# Patient Record
Sex: MALE | Race: WHITE
[De-identification: names, ages, dates, MRNs, and addresses within clinical notes are randomized per-mention and may not be internally consistent; named-entity substitution may affect disease eponyms.]

---

## 2017-06-20 ENCOUNTER — HOSPITAL ENCOUNTER (OUTPATIENT)
Dept: HOSPITAL 45 - C.PATHSPEC | Age: 62
Discharge: HOME | End: 2017-06-20
Attending: DERMATOLOGY
Payer: COMMERCIAL

## 2017-06-20 DIAGNOSIS — L82.1: Primary | ICD-10-CM

## 2018-08-09 ENCOUNTER — HOSPITAL ENCOUNTER (OUTPATIENT)
Dept: HOSPITAL 45 - C.NEUR | Age: 63
Discharge: HOME | End: 2018-08-09
Payer: COMMERCIAL

## 2018-08-09 DIAGNOSIS — G47.33: Primary | ICD-10-CM

## 2018-08-10 NOTE — PAP/PSG TECHNICIAN REPORT
[facility]

Technician Polysomnogram Report



Study name:

None

Report date:

8/10/2018



Study date:

8/9/2018

Referring Physician:

 Ya Spencer



Name:

PATEL OSBORN

Interpreting Physician:

Matt Sanchez M.D.



YOB: 1955

Technician:

Tracie Farias RPS.



Sex:

Male







Age:

62

Study Type:

PSG PAP



Weight:

173 lbs









Height:

62 years, Height 5' 10"







BMI:

24.82







Medications:

BUPROPION 100 MG, MIRTAZAPINE 30 MG, SELENIUM SULFIDE 2.5% LOTION, ATENOLOL 25 MG, LISINOPRIL 10 
MG, MOMETASONE FUROATE 50 MCG/ACT, CLONAZEPAM 0.5 MG, CHLOR-TRIMENTON 4 MG, MULTI VIT, PROBIOTIC, 
LORATIDINE 10 MG















Patient History





62 yr-old male here for a CPAP update study.  He will be switched to BIPAP if CPAP becomes too 
uncomfortable.  He has been having trouble tolerating CPAP treatment.  He complains of mask leak and 
high pressures.  He is back to assess his pressure settings and REZA.  He will try a different full 
face mask to find one that is comfortable.  His [place] scale is 4.

The test was started on room air and 4 CMH2O.

ETCO2 testing was not utilized during this study.

Room 1







Parameters Monitored

NPSG: E1-M2, E2-M1, Fp1-M2, Fp2-M1, F3-M2, F4-M2, F4-M1, C3-M2, C4-M2, C4-M1, O1-M2, O2-M2,

O2-M1, T3-M2, T4-M1, P3-M2, P4-M1, CHIN1, CHIN2, HR, EKG, Legs, PFLOW, SNOR, FLOW, CFLOW,

Tidal Volume, THOR, ABDO, SpO2, PLTH, CPRESS, ETCO2 Wave, ETCO2, pH





Sleep Architecture



Sleep Stages



Time at Lights Off

11:19:45 PM



STAGES

Time (min.)

TST (%)



Time at Lights On

5:51:15 AM



Wake

54.5

--



Total Recording Time (TRT)

391.50 min.



N1

72.0

21



Total Sleep Period (TSP)

382.5 min.



N2

238.0

71



Total Sleep Time (TST)

337.0min.



N3

0.0

0



Awake Time

54.5 min.



REM

27.0

8



Wake after Sleep Onset

45.5 min.











Sleep Efficiency (SE)

86 %











Sleep Onset Latency (SOL)

9.0 min.











Number of Stage 1 Shifts

None











Awakenings

21











Stage Changes

110











Number of REM periods

4



REM

27.0

8



REM Latency

136.5 min.



NREM

310.0

92





Body Position Analysis





Supine

Right

Left

Side

Prone

Vertical



Total Sleep Time (min.)

391.5

0.0

0.0

0.00

0.0

0.0



Total Sleep Time (%)

100%

0%

0%

0

0%

N/A%



Total Sleep Time REM (min.)

27.0

0.0

0.0

None

0.0

0.0



Total Sleep Time NREM (min.)

310.0

0.0

0.0

None

0.0

0.0



Intermittent Wake (min.)

54.5

0.0

0.0

None

0.0

0.0



Total Sleep Period (%)

100%

None





Arousals







Myoclonus (PLM) *







Events

Count

Index



Events

Count

Index



Spontaneous

54

10



Events Awake (PLMW)

48

52.8



Respiratory

21

4.5



Events Asleep w/ Arousal (PLMA)

0

0.0



PLM

0

0



Events Asleep w/o Arousal (PLMS)

17

3.0



Snoring

2

0



Total Asleep

17

3.0



Total

77

14



Total

65

10







Respiratory Analysis *





CA

OA

MA

CH

H

RERA

Total



Count

3

11

1

0

15

3

30



Index

0.5

2.0

0.2

0

2.7

1

5.9



Mean Duration

17.5

22.6

26.2

0.00

26.6

21.9

24.0



Longest Duration

21.7

31.7

26.2

0.00

26.2

24.9

37.3







Respiratory Event Summary 







Total

Supine

~Supine

Right

Left

Prone

REM

NREM



Apneas

Count

15

15

N/A

0

15





Index

2.7

3

N/A

0

3



Hypopneas (4% Desat)

Count

15

15

N/A

0

15





Index

2.7

2.7

N/A

0.0

2.9



Apneas & All Hypopneas 

Count

30

30

N/A

0

30





Index

5.3

5

N/A

0.0

5.8



Respiratory Events 

(Apn+All Hyp+RERA)

Count

30

33

N/A

0

30





Index

5.9

6

N/A

0.0

6.4



Respiratory Related Arousal

Count

21

33

N/A

0

25





Index

4.5

4

N/A

0

5





Snoring Analysis





Supine

Right

Left

Prone

REM

NREM

Total



Snore duration

1.7 min



Snores count

51

N/A

0

51

51



Snore mean duration

2.1 Sec



Snores index

9

N/A

0.0

9.9

9.1



TST with snoring (%)

0.5%







Desaturation Event Summary:



Minimum %SpO2

Event Count

Mean/Min/Max Duration(sec.)

Desaturation Index

% Time In Bed



> 90

31

35.0 / 6.5 / 60.0

4.9

98.9



86 - 90

0

N/A

0.0

1.1



81 - 85

0

N/A

0.0

0.0



76 - 80

0

N/A

0.0

0.0



71 - 75

0

N/A

0.0

0.0



66 - 70

0

N/A

0.0

0.0



61 - 65

0

N/A

0.0

0.0



56 - 60

0

N/A

0.0

0.0



51 - 55

0

N/A

0.0

0.0



< 50

0

N/A

0.0

0.0









Total

REM

NREM

Awake



<50%

0.0 min.



51 - 60%

0.0 min.



61 - 70%

0.0 min.



71 - 80%

0.0 min.



81 - 90%

4.3 min.

0.0 min.

3.1 min.

1.3 min.



91 - 100%

378.2 min.

27.0 min.

307.0 min.

44.3 min.



Average

95

96

95

95



Minimum SpO2

84

91

87

84



Desaturation Event Index

4.8

0.0

5.2

5.5



# Desat. Events below 89%

6

N/A

5

1



Time(%) with Saturation below 89%

0.4

0.0

0.2

0.2



Time(min.) with Saturation below 89%

1.6

0.0

0.9

0.7









Heart Rate Analysis









Min (bpm)

Max (bpm)

Average (bpm)



Awake

70

94

79



NREM

68

92

78



REM

70

87

80



Overall

68

92

78













Supplemental O2 Values



Minimum O2 level: None



Value  

Start Time

End Time





Technician Comments





Mr. Osborn slept only in the supine position.  No cardiac arrhythmias or PLMs noted.  No bruxism 
noted.  CPAP was initiated at +4 CMH2O and up-titrated to a level of +8 CMH2O, Cflex 2.  Late into 
the study he was switched over to BiPAP at +10/6 CMH2O to see if it was more comfortable than CPAP.  
He seemed to have more respiratory events while on BiPAP than on CPAP.  The pressure was then 
up-titrated to a level of +14/10 BiFlex 2.  A Quattro Air full face mask size medium from [employer] was 
used during titration.  He stated that this mask sealed and stayed put better than his usual mask 
which is a Quattro FX full face mask.  He awoke to use the restroom one time during the night.  Mr. Osborn stated that he slept about the same as usual. 

The final report will be interpreted and signed by a sleep physician. The completed physician report 
will then be placed in the patient medical record.







 



 



 



 



 



 



 



 

CPAP REPORT



Therapy Detail

Time / Page #

Comment



CPAP 4 cm H2O  

Full Face Mask  

Flex Pressure Relief  

Humidifier on  



11:18:37 PM / pg. 252





CPAP 6 cm H2O  

Full Face Mask  

Flex Pressure Relief  

Humidifier on  



11:55:52 PM / pg. 327

INCREASED FOR HYPOPNEAS



CPAP 7 cm H2O  

Full Face Mask  

Flex Pressure Relief  

Humidifier on  



2:05:22 AM / pg. 586

INCREASED FOR HYPOPNEAS



CPAP 8 cm H2O  

Full Face Mask  

Flex Pressure Relief  

Humidifier on  



4:27:06 AM / pg. 869

INCREASED FOR RERAS AND HYPOPNEAS



BiLevel 10/6 cm H2O  

Full Face Mask  

Flex Pressure Relief  

Humidifier on  



4:44:06 AM / pg. 903

SWITCHED TO BIPAP TO SEE IF IT WAS MORE COMFORTABLE THAN CPAP OF 8



BiLevel 12/8 cm H2O  

Full Face Mask  

Flex Pressure Relief  

Humidifier on  



5:01:37 AM / pg. 938

INCREASED EPAP FOR OBSTRUCTIVE APNEAS AND INCREASED THE IPAP TO KEEP THE PRESSURE DIFFERENTIAL OF 4 
CM



BiLevel 14/10 cm H2O  

Full Face Mask  

Flex Pressure Relief  

Humidifier on  



5:35:51 AM / pg. 1007

INCREASED EPAP FOR MORE OBSTRUCTIVE APNEAS AND INCREASED IPAP TO KEEP THE PRESSURE DIFFERENTIAL OF 
4 CM



Therapy Event:



Therapy (cm H20)

4

6

7

8

10/6

12/8

14/10



Total Time at Pressure (min.)

36.1

129.5

141.7

17.0

17.5

34.2

15.4



TST at Pressure (min.)

26.1

124.5

115.7

11.1

12.4

31.7

15.4



# Periods

1



Sleep Onset (min.)

9.0

0.0

3.1

0.0

0.0



REM Onset (min.)

N/A

109.4

62.9

N/A

N/A

6.1

N/A



Sleep Efficiency %

72

96

81

65

70

92

100



Wakefulness (%)

27.7

3.9

18.3

34.4

29.4

7.3

0.0



Wakefulness (min.)

10.0

5.0

26.0

5.9

5.1

2.5

0.0



NREM 1 (%)

31.8

7.3

15.5

17.6

67.8

37.2

9.0



NREM 1 (min.)

11.5

9.5

22.0

3.0

11.9

12.7

1.4



NREM 2 (%)

40.5

84.9

55.2

47.9

2.9

36.5

91.0



NREM 2 (min.)

14.6

110.0

78.2

8.1

0.5

12.5

14.0



NREM 3 (%)

0.0



NREM 3 (min.)

0.0



REM (%)

0.0

3.9

10.9

0.0

0.0

19.0

0.0



REM (min.)

0.0

5.0

15.5

0.0

0.0

6.5

0.0



# Arousals

7

17

23

6

8

14

2



Arousal Index

16.1

8.2

11.9

32.3

38.8

26.5

7.8



# Snore

8

25

8

3

4

2

1



Snore Index

18.4

12.0

4.1

16.1

19.4

3.8

3.9



AHI

9.2

1.9

1.6

26.9

34.0

9.4

7.8



AHI Supine

9.2

1.9

1.6

26.9

34.0

9.4

7.8



AHI Non-Supine

N/A



NREM AHI

9.2

2.0

1.8

26.9

34.0

11.9

7.8



REM AHI

N/A

0.0

0.0

N/A

N/A

0.0

N/A



RDI

9.2

2.9

2.1

26.9

34.0

9.4

7.8



# Obstructive

1

2

0

0

3

3

2



# Central Ap

0

2

1

0



# Mixed

0

1

0



# Hypopneas

3

2

3

5

2

0

0



RERAS

0

2

1

0



Total Respiratory Events

4

6

4

5

7

5

2



Time Below SpO2 89.00% (min.)

0.0

0.2

0.2

0.4



Mean NREM SpO2 (%)

94

94

95



Mean REM SpO2 (%)

N/A

95

96

N/A

N/A

95

N/A



Mean Sleep SpO2 (%)

94

94

95



Min NREM SpO2 (%)

89

88

91

89

87



Min REM SpO2 (%)

N/A

93

91

N/A

N/A

94

N/A



Position Supine (min.)

26.1

124.5

115.7

11.1

12.4

31.7

15.4



Position Non-supine (min.)

0.0



LM Index Sleep

0.0

2.9

2.6

10.8

9.7

3.8

0.0



LM Index NREM

0.0

3.0

0.6

10.8

9.7

4.8

0.0



LM Index REM

N/A

0.0

15.5

N/A

N/A

0.0

N/A



Mean Heart Rate (bpm)

80

81

78

77

74

75

75



Min Heart Rate (bpm)

75

75

69

71

69

68

70